# Patient Record
Sex: MALE | Race: WHITE
[De-identification: names, ages, dates, MRNs, and addresses within clinical notes are randomized per-mention and may not be internally consistent; named-entity substitution may affect disease eponyms.]

---

## 2018-02-16 ENCOUNTER — HOSPITAL ENCOUNTER (EMERGENCY)
Dept: HOSPITAL 74 - FER | Age: 68
Discharge: HOME | End: 2018-02-16
Payer: SELF-PAY

## 2018-02-16 VITALS — DIASTOLIC BLOOD PRESSURE: 100 MMHG | SYSTOLIC BLOOD PRESSURE: 153 MMHG

## 2018-02-16 VITALS — HEART RATE: 99 BPM

## 2018-02-16 VITALS — BODY MASS INDEX: 36.3 KG/M2

## 2018-02-16 VITALS — TEMPERATURE: 99.4 F

## 2018-02-16 DIAGNOSIS — I10: ICD-10-CM

## 2018-02-16 DIAGNOSIS — R05: Primary | ICD-10-CM

## 2018-02-16 DIAGNOSIS — E11.9: ICD-10-CM

## 2018-02-16 LAB
ALBUMIN SERPL-MCNC: 3.9 G/DL (ref 3.5–5)
ALP SERPL-CCNC: 80 U/L (ref 32–92)
ALT SERPL-CCNC: 32 U/L (ref 10–40)
ANION GAP SERPL CALC-SCNC: 7 MMOL/L (ref 8–16)
AST SERPL-CCNC: 27 U/L (ref 10–42)
BASOPHILS # BLD: 0.4 % (ref 0–2)
BILIRUB SERPL-MCNC: 1 MG/DL (ref 0.2–1)
BUN SERPL-MCNC: 17 MG/DL (ref 7–18)
CALCIUM SERPL-MCNC: 9.2 MG/DL (ref 8.4–10.2)
CHLORIDE SERPL-SCNC: 102 MMOL/L (ref 98–107)
CO2 SERPL-SCNC: 27 MMOL/L (ref 22–28)
CREAT SERPL-MCNC: 0.8 MG/DL (ref 0.6–1.3)
DEPRECATED RDW RBC AUTO: 11.4 % (ref 11.9–15.9)
EOSINOPHIL # BLD: 1.4 % (ref 0–4.5)
GLUCOSE SERPL-MCNC: 258 MG/DL (ref 74–106)
HCT VFR BLD CALC: 46.3 % (ref 35.4–49)
HGB BLD-MCNC: 16.2 GM/DL (ref 11.7–16.9)
INR BLD: 1.07 (ref 0.82–1.09)
LYMPHOCYTES # BLD: 21.8 % (ref 8–40)
MCH RBC QN AUTO: 32.3 PG (ref 25.7–33.7)
MCHC RBC AUTO-ENTMCNC: 35 G/DL (ref 32–35.9)
MCV RBC: 92.4 FL (ref 80–96)
MONOCYTES # BLD AUTO: 7.8 % (ref 3.8–10.2)
NEUTROPHILS # BLD: 68.6 % (ref 42.8–82.8)
PLATELET # BLD AUTO: 291 K/MM3 (ref 134–434)
PMV BLD: 7.8 FL (ref 7.5–11.1)
POTASSIUM SERPLBLD-SCNC: 3.9 MMOL/L (ref 3.5–5.1)
PROT SERPL-MCNC: 7 G/DL (ref 6.4–8.3)
PT PNL PPP: 12 SEC (ref 10.2–13)
RBC # BLD AUTO: 5.01 M/MM3 (ref 4–5.6)
SODIUM SERPL-SCNC: 136 MMOL/L (ref 136–145)
WBC # BLD AUTO: 8.7 K/MM3 (ref 4–10.8)

## 2018-02-16 PROCEDURE — 3E0337Z INTRODUCTION OF ELECTROLYTIC AND WATER BALANCE SUBSTANCE INTO PERIPHERAL VEIN, PERCUTANEOUS APPROACH: ICD-10-PCS

## 2018-02-16 NOTE — PDOC
Attending Attestation





- Resident


Resident Name: Tirso Jimenez





- ED Attending Attestation


I have performed the following: I have examined & evaluated the patient, The 

case was reviewed & discussed with the resident, I agree w/resident's findings 

& plan, Exceptions are as noted





- HPI


HPI: 





02/16/18 10:58





66 yo M c/ hx of HTN, DM p/w cough x 3 weeks.


The patient travels for several months of the year to [facility].


Recently returned yesterday.


For 3 weeks, has had coughing with in the last 3 days with small amounts of 

hemoptysis.


Denies chest or SOB, but has been endorsing tactile fevers and night sweats.


Pt denies prior hx of TB but does report that TB is quite prevalent in [facility].


Flew in yesterday and came in today for an evaluation.





- Physicial Exam


PE: 





02/16/18 11:00





GENERAL: Awake, alert, and fully oriented, in no acute distress. 


HEAD: No signs of trauma


EYES: PERRLA, EOMI, sclera anicteric, conjunctiva clear


ENT: Auricles normal inspection, hearing grossly normal, nares patent, 

oropharynx clear without exudates.  


NECK: Normal ROM, supple


LUNGS: Breath sounds grossly clear bilaterally. Occasionally ronchorous.


HEART: Regular rate and rhythm, normal S1 and S2, no murmurs, rubs or gallops


ABDOMEN: Soft, nontender, normoactive bowel sounds.  No guarding, no rebound.  

No masses


EXTREMITIES: Normal range of motion, no edema.  No clubbing or cyanosis. No 

cords, erythema, or tenderness


NEUROLOGICAL: Cranial nerves II through XII grossly intact.  Normal speech, 

normal gait


SKIN: Warm, Dry, normal turgor, no rashes or lesions noted.





- Medical Decision Making





02/16/18 11:00


 Vital Signs











Temp Pulse Resp BP Pulse Ox


 


 99.4 F   110 H  18   161/101   96 


 


 02/16/18 10:22  02/16/18 10:22  02/16/18 10:22  02/16/18 10:22  02/16/18 10:22








The patient overall appears nontoxic, but given his history of DM and cough, it'

ll be important to evaluate for PNA, bronchitis, vs. viral syndrome. However, 

given the small amounts of hemotypsis and from [facility], TB should be 

considered. Patient is placed in isolation. Consult ID after results and 

imaging return. In addition, will also perform CTA to rule out PE. Labs, 

cultures, and reassess.


02/16/18 13:23





CAT scan the chest demonstrates no acute lung pathology. Does demonstrate a 

possible partially left adrenal nodule measuring 1.8 cm. This may potentially 

be adrenal adenoma.





The resident Dr. Jimenez had consulted Dr. Longoria. According to infectious disease, 

if the CAT scan of the head demonstrates no findings consistent with 

tuberculosis, patient can be discharged. CAT scan demonstrates no acute findings

, so we'll discharge as bronchitis. We'll initiate with azithromycin and have 

the patient follow-up with his primary care physician.





We'll inform the patient and his son regarding the nodule to have this follow-

up with his doctor.





**Heart Score/ECG Review


  ** #1


ECG reviewed & interpreted by me at: 11:20





02/16/18 11:22


, no std/luciana, T wave flat III,  msec

## 2018-02-16 NOTE — PDOC
History of Present Illness





- General


Chief Complaint: Cold Symptoms


Stated Complaint: COUGH


Time Seen by Provider: 02/16/18 10:25


History Source: Patient


Exam Limitations: No Limitations





- History of Present Illness


Initial Comments: 





02/16/18 11:13


Patient is a 67M immigrant from [facility] (arrived yesterday), HTN, HLD, and DM (

on metformin) here today complaining of scant hemoptysis for the past 3 days, 

with a preceding cough for the past 3 weeks. He endorses associated nightsweats

, and pain with swallowing. Patient denies history of incarceration. Patient 

denies chest pain, history of clots, leg swelling. Patient believes that he was 

vaccinated for TB. Patient denies sick contacts. Denies nausea, vomiting, 

abdominal pain. Denies smoking and illicits. Endorses alcohol use.





Past History





- Past Medical History


Allergies/Adverse Reactions: 


 Allergies











Allergy/AdvReac Type Severity Reaction Status Date / Time


 


No Known Allergies Allergy   Verified 02/16/18 10:31











Home Medications: 


Ambulatory Orders





Azithromycin [Zithromax 250mg Tablets -] 250 mg PO UTDICT #6 tab 02/16/18 


Azithromycin [Zithromax 250mg Tablets -] 250 mg PO UTDICT #6 tab 02/16/18 


Enalapril Maleate [Vasotec] 20 mg PO DAILY 02/16/18 


Metformin HCl 0 mg PO BID 02/16/18 








COPD: No


Diabetes: Yes


HTN: Yes





- Suicide/Smoking/Psychosocial Hx


Smoking History: Never smoked


Hx Alcohol Use: Yes


Drug/Substance Use Hx: No


Substance Use Type: Alcohol





**Review of Systems





- Review of Systems


Comments:: 





02/16/18 11:18


GENERAL/CONSTITUTIONAL: Positive for fever or chills. No weakness.


HEAD, EYES, EARS, NOSE AND THROAT: No change in vision. No ear pain or 

discharge. Positive for sore throat.


CARDIOVASCULAR: No chest pain or shortness of breath


RESPIRATORY: Positive for cough and hemoptysis


GASTROINTESTINAL: No nausea, vomiting, diarrhea. Positive for constipation.


GENITOURINARY: No dysuria, frequency, or change in urination.


MUSCULOSKELETAL: No joint or muscle swelling or pain. No neck or back pain.


SKIN: No rash


NEUROLOGIC: No headache, vertigo, loss of consciousness, or change in strength/

sensation.


ENDOCRINE: No increased thirst. No abnormal weight change


HEMATOLOGIC/LYMPHATIC: No anemia, easy bleeding, or history of blood clots.


ALLERGIC/IMMUNOLOGIC: No hives or skin allergy.








*Physical Exam





- Vital Signs


 Last Vital Signs











Temp Pulse Resp BP Pulse Ox


 


 99.4 F   110 H  18   161/101   96 


 


 02/16/18 10:22  02/16/18 10:22  02/16/18 10:22  02/16/18 10:22  02/16/18 10:22














- Physical Exam


Comments: 





02/16/18 11:19


GENERAL: Awake, alert, and fully oriented, in no acute distress, coughing


HEAD: No signs of trauma, normocephalic, atraumatic


EYES: PERRLA, EOMI, sclera anicteric, conjunctiva clear


ENT: Auricles normal inspection, hearing grossly normal, nares patent, 

oropharynx clear without


exudates. Moist mucosa


NECK: Normal ROM, supple, no lymphadenopathy, JVD, or masses


LUNGS: Coarse breath sounds, speaks full sentences, clear to auscultation 

bilaterally


HEART: Regular rate and rhythm, normal S1 and S2, no murmurs, rubs or gallops, 

peripheral pulses normal and equal bilaterally.


ABDOMEN: Soft, nontender, normoactive bowel sounds.  No guarding, no rebound.  

No masses


EXTREMITIES: Normal inspection, Normal range of motion, no edema.  No clubbing 

or cyanosis.


NEUROLOGICAL: Cranial nerves II through XII grossly intact.  Normal speech, 

normal gait, no focal sensorimotor deficits


SKIN: Warm, Dry, normal turgor, no rashes or lesions noted.








ED Treatment Course





- LABORATORY


CBC & Chemistry Diagram: 


 02/16/18 10:58





 02/16/18 10:58





- RADIOLOGY


Radiology Studies Ordered: 














 Category Date Time Status


 


 CHEST CTA [CT] Stat CT Scan  02/16/18 10:42 Ordered


 


 CHEST X-RAY PORTABLE* [RAD] Stat Radiology  02/16/18 10:42 Ordered














Medical Decision Making





- Medical Decision Making





02/16/18 11:21


67M immigrant from [facility], HTN, DM, HLD here today complaining of hemoptysis. 

Vital signs notable for tachycardia. Differential diagnosis is broad, but 

includes: TB, PE, pneumonia, new onset copd exacerbation. Will evaluate with cbc

, cmp, lactate, pt/inr, cxr, cta pe, trop. Will consult ID. Will treat with 1L 

of fluids.


02/16/18 11:46


EKG shows sinus tachycardia, rate 103. Normal axis. Normal NC, QRS, QTc 

intervals. No st elevations. No significant t wave abnormalities.





CXR read as normal.


02/16/18 12:35


Dr Longoria consulted, appreciate recs. He recommends CT scan of chest, says that 

patient is safe to discharge with outpatient workup if CT negative.


02/16/18 12:36


 Laboratory Tests











  02/16/18 02/16/18 02/16/18





  10:58 10:58 10:58


 


WBC  8.7  


 


Hgb  16.2  


 


Hct  46.3  


 


Plt Count  291  


 


INR    1.07


 


Anion Gap   7 L 


 


Random Glucose   258 H 


 


Troponin I   














  02/16/18





  10:58


 


WBC 


 


Hgb 


 


Hct 


 


Plt Count 


 


INR 


 


Anion Gap 


 


Random Glucose 


 


Troponin I  < 0.03








CBC normal. INR negative. Glucose elevated, rest of CMP reassuring. Trop 

undetectable. Lactate pending. CXR read as normal by radiology. CT chest 

pending.


02/16/18 13:22


CTA chest shows no PE, shows possible adrenal adenoma. Patient advised of result

, told to follow up with primary care physician. Lactate canceled given patient'

s well appearance and lac of gap on CMP.


02/16/18 13:33


HR 99. Patient appears well. Will follow up with PCP. 





*DC/Admit/Observation/Transfer


Diagnosis at time of Disposition: 


 Cough








- Discharge Dispostion


Disposition: HOME


Condition at time of disposition: Good


Admit: No





- Prescriptions


Prescriptions: 


Azithromycin [Zithromax 250mg Tablets -] 250 mg PO UTDICT #6 tab


Azithromycin [Zithromax 250mg Tablets -] 250 mg PO UTDICT #6 tab





- Referrals





- Patient Instructions


Printed Discharge Instructions:  DI for Acute Bronchitis


Additional Instructions: 


You were seen today in the ED for cough. You were given a prescription for 

antibiotics. Please take them as prescribed and complete the course even if you 

feel better. 





You were found to have a possible adrenal adenoma. A copy of your CT scan 

report has been provided to you. Please follow up with your primary care 

physician.





Please return if you have any new worsening or concerning symptoms. Please 

follow up with your PCP in the next week.





- Post Discharge Activity

## 2018-02-19 NOTE — EKG
Test Reason : 

Blood Pressure : ***/*** mmHG

Vent. Rate : 103 BPM     Atrial Rate : 103 BPM

   P-R Int : 180 ms          QRS Dur : 084 ms

    QT Int : 352 ms       P-R-T Axes : 048 -10 055 degrees

   QTc Int : 461 ms

 

SINUS TACHYCARDIA

CANNOT RULE OUT INFERIOR INFARCT , AGE UNDETERMINED

ABNORMAL ECG

NO PREVIOUS ECGS AVAILABLE

Confirmed by JAYJAY JORDAN MD ([id number]) on 2/19/2018 7:56:30 PM

 

Referred By: CRISTIAN PETERSON           Confirmed By:JAYJAY JORDAN MD

## 2018-09-15 ENCOUNTER — HOSPITAL ENCOUNTER (EMERGENCY)
Dept: HOSPITAL 74 - FER | Age: 68
Discharge: HOME | End: 2018-09-15
Payer: COMMERCIAL

## 2018-09-15 VITALS — TEMPERATURE: 98.5 F | HEART RATE: 68 BPM | DIASTOLIC BLOOD PRESSURE: 92 MMHG | SYSTOLIC BLOOD PRESSURE: 148 MMHG

## 2018-09-15 VITALS — BODY MASS INDEX: 36.1 KG/M2

## 2018-09-15 DIAGNOSIS — I10: Primary | ICD-10-CM

## 2018-09-15 DIAGNOSIS — E11.9: ICD-10-CM

## 2018-09-15 NOTE — PDOC
History of Present Illness





- General


History Source: Patient, Family (Daughter.)


Exam Limitations: No Limitations





- History of Present Illness


Initial Comments: 





09/15/18 18:51


The patient is a 67 year old male, with a significant past medical history of 

HTN, HLD, DM, who presents to the emergency department with, 2 days of elevated 

blood pressure. As per patients daughter, his blood pressure has been ranging 

between 170s/90s to 190s/110s with an associated headache and diffuse weakness. 

She reports that today the patient doubled his dosage of Enalapril 20 mg (one 

morning and one evening) as his PCP (unknown doctor at [facility]) recommended. 

The daughter also reports that for many years this patients blood pressure 

normally becomes elevated around this time of year. The patient was recently 

evaluated by his cardiologist and pulmonologist without any pertinent findings 

within the past month. He denies any loss of strength or sensation. He denies 

any recent fevers, chills, or dizziness. He denies any recent nausea, vomit, 

diarrhea or constipation. He denies any recent chest pain or shortness of 

breath. He denies any recent dysuria, frequency, urgency or hematuria. The 

history was taken from the patients daughter at bedside due to language 

barriers. 





Allergies: NKA


Past surgical history: None reported.


Social History: Social alcohol usage. Nonsmoker. Denies recreational drug use. 








<Germán Cross - Last Filed: 09/15/18 18:50>





<Bethany Plummer - Last Filed: 09/16/18 11:46>





- General


Chief Complaint: Blood Pressure Problem


Stated Complaint: HTN


Time Seen by Provider: 09/15/18 18:34





Past History





<Germán Cross - Last Filed: 09/15/18 18:50>





- Past Medical History


COPD: No


Diabetes: Yes


HTN: Yes





- Suicide/Smoking/Psychosocial Hx


Smoking History: Never smoked


Have you smoked in the past 12 months: No


Information on smoking cessation initiated: No


Hx Alcohol Use: No


Drug/Substance Use Hx: No


Substance Use Type: Alcohol





<Bethany Plummer - Last Filed: 09/16/18 11:46>





- Past Medical History


Allergies/Adverse Reactions: 


 Allergies











Allergy/AdvReac Type Severity Reaction Status Date / Time


 


No Known Allergies Allergy   Verified 09/15/18 18:25











Home Medications: 


Ambulatory Orders





Enalapril Maleate [Vasotec] 20 mg PO DAILY 02/16/18 


metFORMIN HCL [Metformin HCl] 2,000 mg PO BID 02/16/18 


Atorvastatin Ca [Lipitor] 40 mg PO HS 09/15/18 











**Review of Systems





- Review of Systems


Able to Perform ROS?: Yes


Comments:: 





09/15/18 18:51


+GENERAL/CONSTITUTIONAL: Diffuse weakness. No fever or chills. 


HEAD, EYES, EARS, NOSE AND THROAT: No change in vision. No ear pain or 

discharge. No sore throat.


CARDIOVASCULAR: No chest pain or shortness of breath.


RESPIRATORY: No cough, wheezing, or hemoptysis.


GASTROINTESTINAL: No nausea, vomiting, diarrhea or constipation.


GENITOURINARY: No dysuria, frequency, or change in urination.


MUSCULOSKELETAL: No joint or muscle swelling or pain. No neck or back pain.


SKIN: No rash


+NEUROLOGIC: Headache. No vertigo, loss of consciousness, or change in strength/

sensation.


ENDOCRINE: No increased thirst. No abnormal weight change.


HEMATOLOGIC/LYMPHATIC: No anemia, easy bleeding, or history of blood clots.


ALLERGIC/IMMUNOLOGIC: No hives or skin allergy.





All Other Systems: Reviewed and Negative





<Germán Cross - Last Filed: 09/15/18 18:50>





*Physical Exam





- Vital Signs


 Last Vital Signs











Temp Pulse Resp BP Pulse Ox


 


 98.5 F   68   20   148/92   99 


 


 09/15/18 18:25  09/15/18 18:25  09/15/18 18:25  09/15/18 18:25  09/15/18 18:25














- Physical Exam


Comments: 





09/15/18 18:51


GENERAL: Awake, alert, and fully oriented, in no acute distress


HEAD: No signs of trauma


ENT: Hearing grossly normal, nares patent, oropharynx clear without exudates. 

Moist mucosa


NECK: Normal ROM, supple, no lymphadenopathy, JVD, or masses


LUNGS: Breath sounds equal, clear to auscultation bilaterally.  No wheezes, and 

no crackles


HEART: Regular rate and rhythm, normal S1 and S2, no murmurs, rubs or gallops


ABDOMEN: Soft, nontender, normoactive bowel sounds.  No guarding, no rebound.  

No masses


EXTREMITIES: Normal range of motion, no edema.  No clubbing or cyanosis. No 

cords, erythema, or tenderness


NEUROLOGICAL: Cranial nerves II through XII grossly intact.  Normal speech, 

normal gait


SKIN: Warm, Dry, normal turgor, no rashes or lesions noted.





<Germán Cross - Last Filed: 09/15/18 18:50>





- Vital Signs


 Last Vital Signs











Temp Pulse Resp BP Pulse Ox


 


 98.5 F   68   20   148/92   99 


 


 09/15/18 18:25  09/15/18 18:25  09/15/18 18:25  09/15/18 18:25  09/15/18 18:25














<Bethany Plummer - Last Filed: 09/16/18 11:46>





**Heart Score/ECG Review





- ECG Impressions


Comment:: 





EKG read 19:03- sinus rhythm with 1st deg AV block, rate 68 bpm, incomplete RBBB


Similar to prior EKG from February 2018








<Bethany Plummer - Last Filed: 09/16/18 11:46>





Medical Decision Making





- Medical Decision Making





Pt with HTN, no significant neuro or cardiac symptoms. BP is at his baseline at 

present after taking extra dose of enalapril. As this change in BP seems to 

happen at certain times of the year, would not add additional medications at 

this time. Will refer back to his PMD, as he will need to be monitored closely 

if any changes needed. 








<Bethany Plummer - Last Filed: 09/16/18 11:46>





*DC/Admit/Observation/Transfer





- Attestations


Scribe Attestion: 





09/15/18 18:51





Documentation prepared by Germán Cross, acting as medical scribe for 

Bethany Plummer MD.





<Germán Cross - Last Filed: 09/15/18 18:50>





- Discharge Dispostion


Decision to Admit order: No





<Bethany Plummer - Last Filed: 09/16/18 11:46>


Diagnosis at time of Disposition: 


Hypertension


Qualifiers:


 Hypertension type: essential hypertension Qualified Code(s): I10 - Essential (

primary) hypertension








- Discharge Dispostion


Disposition: HOME


Condition at time of disposition: Stable





- Patient Instructions


Printed Discharge Instructions:  DI for High Blood Pressure


Additional Instructions: 


CONTINUE TO TAKE MEDICATION AS PRESCRIBED. IF BLOOD PRESSURE IS ELEVATED 

TOMORROW, CAN TAKE AN EXTRA DOSE OF MEDICATION AS YOU DID TODAY. FOLLOW UP WITH 

YOUR DOCTOR ON MONDAY, AS THE MEDICATION MAY NEED TO BE ADJUSTED IF YOUR BLOOD 

PRESSURE REMAINS HIGH.

## 2018-09-16 NOTE — EKG
Test Reason : 

Blood Pressure : ***/*** mmHG

Vent. Rate : 067 BPM     Atrial Rate : 067 BPM

   P-R Int : 218 ms          QRS Dur : 092 ms

    QT Int : 398 ms       P-R-T Axes : 043 -04 046 degrees

   QTc Int : 420 ms

 

SINUS RHYTHM WITH 1ST DEGREE A-V BLOCK

INCOMPLETE RIGHT BUNDLE BRANCH BLOCK

POSSIBLE INFERIOR INFARCT (CITED ON OR BEFORE 16-FEB-2018)

ABNORMAL ECG

WHEN COMPARED WITH ECG OF 16-FEB-2018 11:20,

TX INTERVAL HAS INCREASED

VENT. RATE HAS DECREASED BY  36 BPM

Confirmed by ZULY MACEDO MD ([id number]) on 9/16/2018 10:21:54 PM

 

Referred By: DR GAO           Confirmed By:ZULY MACEDO MD

## 2020-02-03 ENCOUNTER — HOSPITAL ENCOUNTER (EMERGENCY)
Dept: HOSPITAL 74 - FER | Age: 70
Discharge: HOME | End: 2020-02-03
Payer: COMMERCIAL

## 2020-02-03 VITALS — DIASTOLIC BLOOD PRESSURE: 97 MMHG | HEART RATE: 76 BPM | SYSTOLIC BLOOD PRESSURE: 139 MMHG

## 2020-02-03 VITALS — BODY MASS INDEX: 34.5 KG/M2

## 2020-02-03 VITALS — TEMPERATURE: 98.5 F

## 2020-02-03 DIAGNOSIS — R42: Primary | ICD-10-CM

## 2020-02-03 DIAGNOSIS — I10: ICD-10-CM

## 2020-02-03 DIAGNOSIS — E11.9: ICD-10-CM

## 2020-02-03 DIAGNOSIS — E78.5: ICD-10-CM

## 2020-02-03 LAB
ALBUMIN SERPL-MCNC: 4.1 G/DL (ref 3.4–5)
ALP SERPL-CCNC: 60 U/L (ref 45–117)
ALT SERPL-CCNC: 35 U/L (ref 13–61)
ANION GAP SERPL CALC-SCNC: 7 MMOL/L (ref 8–16)
AST SERPL-CCNC: 23 U/L (ref 15–37)
BASOPHILS # BLD: 0.4 % (ref 0–2)
BILIRUB SERPL-MCNC: 0.8 MG/DL (ref 0.2–1)
BUN SERPL-MCNC: 15 MG/DL (ref 7–18)
CALCIUM SERPL-MCNC: 9.1 MG/DL (ref 8.5–10)
CHLORIDE SERPL-SCNC: 102 MMOL/L (ref 98–107)
CO2 SERPL-SCNC: 28 MMOL/L (ref 21–32)
CREAT SERPL-MCNC: 1 MG/DL (ref 0.55–1.3)
DEPRECATED RDW RBC AUTO: 11.9 % (ref 11.9–15.9)
EOSINOPHIL # BLD: 1.7 % (ref 0–4.5)
GLUCOSE SERPL-MCNC: 155 MG/DL (ref 74–106)
HCT VFR BLD CALC: 42.8 % (ref 35.4–49)
HGB BLD-MCNC: 14.2 GM/DL (ref 11.7–16.9)
LYMPHOCYTES # BLD: 26.8 % (ref 8–40)
MCH RBC QN AUTO: 30.6 PG (ref 25.7–33.7)
MCHC RBC AUTO-ENTMCNC: 33.2 G/DL (ref 32–35.9)
MCV RBC: 92.3 FL (ref 80–96)
MONOCYTES # BLD AUTO: 7 % (ref 3.8–10.2)
NEUTROPHILS # BLD: 64.1 % (ref 42.8–82.8)
PLATELET # BLD AUTO: 252 K/MM3 (ref 134–434)
PMV BLD: 8 FL (ref 7.5–11.1)
POTASSIUM SERPLBLD-SCNC: 3.8 MMOL/L (ref 3.5–5.1)
PROT SERPL-MCNC: 7 G/DL (ref 6.4–8.2)
RBC # BLD AUTO: 4.64 M/MM3 (ref 4–5.6)
SODIUM SERPL-SCNC: 137 MMOL/L (ref 136–145)
WBC # BLD AUTO: 6.8 K/MM3 (ref 4–10.8)

## 2020-02-03 NOTE — PDOC
History of Present Illness





<Joel Harrington - Last Filed: 02/03/20 12:39>





- General


History Source: Patient, Family


Exam Limitations: Language Barrier





- History of Present Illness


Initial Comments: 





02/03/20 09:50


Nick Negrete is a 69 year old Japanese male with PMH HTN, HLD, DM presenting 

with one month of dizziness and unsteady gait.





Patient presents with daughter who lives with him and translates. Patient and 

daughter report patient has been having one month of what patient describes as 

dizziness, now with worsening unsteady gait for the last 10 days. Does not say 

that his legs feel weak or that the room is spinning, but rather that he feels 

off-balance like he is going to fall. Almost fell 3 days ago, denies any head 

injury or LOC. Daughter brought here today for worsening ability to ambulate.





Denies prior history of dizziness, denies room-spinning vertigo, denies 

tinnitus. Denies any chest pain, palpitations, SOB, abdominal pain. Does 

endorse a HA, occurs sporadically in the back of his head, no inciting triggers

, stays for 40 minutes and disappears on its own, has trialled Tylenol and 

Advil without effect, denies vision changes or related N/V.





No known allergies.


Takes anti-hypertensive medications daily, says BP has been elevated.


PMD Dr. Lynch (?), has not seen in >6 months.


PSH R ankle procedure for injury a long time ago, R eye surgery








<Campbell Lux - Last Filed: 02/03/20 15:48>





- General


Chief Complaint: Lightheaded


Stated Complaint: DIZZINESS


Time Seen by Provider: 02/03/20 09:49





Past History





<Joel Harrington - Last Filed: 02/03/20 12:39>





- Past Medical History


COPD: No


Diabetes: Yes


HTN: Yes





- Psycho Social/Smoking Cessation Hx


Smoking History: Never smoked


Have you smoked in the past 12 months: No


Hx Alcohol Use: No


Drug/Substance Use Hx: No


Substance Use Type: Alcohol





<Campbell Lux - Last Filed: 02/03/20 15:48>





- Past Medical History


Allergies/Adverse Reactions: 


 Allergies











Allergy/AdvReac Type Severity Reaction Status Date / Time


 


No Known Allergies Allergy   Verified 02/03/20 09:49











Home Medications: 


Ambulatory Orders





Aspirin [Aspirin EC] 81 mg PO DAILY 02/03/20 


Atorvastatin Ca [Lipitor] 10 mg PO HS 02/03/20 


Enalapril Maleate [Vasotec] 20 mg PO DAILY 02/03/20 


Hydrochlorothiazide [Hctz -] 25 mg PO DAILY 02/03/20 


Meclizine HCl [Antivert -] 25 mg PO TID #20 tablet 02/03/20 


Metformin HCl [Glucophage] 1,000 mg PO BID 02/03/20 











**Review of Systems





- Review of Systems


Able to Perform ROS?: Yes


Constitutional: No: Chills, Fever


HEENTM: No: Blurred Vision, Double Vision, Hearing Loss


Respiratory: No: Cough, Shortness of Breath, Productive cough


Cardiac (ROS): No: Chest Pain, Edema, Irregular Heart Rate, Lightheadedness, 

Palpitations, Syncope, Chest Tightness


ABD/GI: No: Constipated, Diarrhea, Nausea, Poor Appetite, Poor Fluid Intake, 

Vomiting


: No: Symptoms Reported


Musculoskeletal: No: Back Pain, Muscle Pain, Muscle Weakness, Neck Pain


Integumentary: No: Symptoms Reported


Neurological: Yes: Headache, Unsteady Gait, Dizziness.  No: Numbness, 

Paresthesia, Weakness


Endocrine: No: Symptoms Reported


Hematologic/Lymphatic: No: Symptoms Reported


All Other Systems: Reviewed and Negative





<Campbell Lux - Last Filed: 02/03/20 15:48>





*Physical Exam





- Vital Signs


 Last Vital Signs











Temp Pulse Resp BP Pulse Ox


 


 98.5 F   73   16   156/103 H  96 


 


 02/03/20 09:47  02/03/20 11:58  02/03/20 10:05  02/03/20 11:58  02/03/20 10:05














<Joel Harrington - Last Filed: 02/03/20 12:39>





- Physical Exam


General Appearance: Yes: Nourished, Appropriately Dressed, Other (well-

appearing male resting comfortably in bed in no acute distress).  No: Apparent 

Distress


HEENT: positive: EOMI, NARA, Normal ENT Inspection, Normal Voice, Symmetrical, 

Pharynx Normal, Hearing Grossly Normal.  negative: Scleral Icterus (R), Scleral 

Icterus (L), Pharyngeal Erythema, Tonsillar Exudate, Hearing Decreased


Neck: positive: Trachea midline, Normal Thyroid, Supple.  negative: Tender, 

Decreased range of motion, Lymphadenopathy (R), Lymphadenopathy (L)


Respiratory/Chest: positive: Lungs Clear, Normal Breath Sounds.  negative: 

Chest Tender, Respiratory Distress, Accessory Muscle Use, Crackles, Rales, 

Rhonchi, Stridor, Wheezing


Cardiovascular: positive: Regular Rhythm, Regular Rate.  negative: Murmur


Gastrointestinal/Abdominal: positive: Normal Bowel Sounds, Soft, Protuberent.  

negative: Tender, Organomegaly, Pulsatile Mass, Guarding, Rebound


Musculoskeletal: positive: Normal Inspection.  negative: CVA Tenderness, 

Decreased Range of Motion


Extremity: positive: Normal Capillary Refill, Normal Inspection, Normal Range 

of Motion, Pelvis Stable.  negative: Tender


Integumentary: positive: Normal Color, Dry, Warm.  negative: Jaundice, Mottled, 

Cold, Clammy, Diaphoresis, Swelling


Neurologic: positive: CNs II-XII NML intact, Fully Oriented, Alert, Normal Mood/

Affect, Normal Response, Motor Strength 5/5, Finger to Nose (normal), Other (

Normal gait, but has truncal ataxia after standing/walking for >30 seconds. CN 

exam normal. 5/5 motor strength all groups. No sensory deficits to LT. No 

nystagmus. HINTS: no saccades, no nystagmus, no skew.).  negative: Numbness, 

Sensory Deficit





<Campbell Lux - Last Filed: 02/03/20 15:48>





ED Treatment Course





- LABORATORY


CBC & Chemistry Diagram: 


 02/03/20 10:20





 02/03/20 10:20





- ADDITIONAL ORDERS


Additional order review: 


 Laboratory  Results











  02/03/20 02/03/20 02/03/20





  10:20 10:20 10:20


 


Sodium    137


 


Potassium    3.8


 


Chloride    102


 


Carbon Dioxide    28


 


Anion Gap    7 L


 


BUN    15.0


 


Creatinine    1.0


 


Est GFR (CKD-EPI)AfAm    89.23


 


Est GFR (CKD-EPI)NonAf    76.99


 


Random Glucose    155 H


 


Calcium    9.1


 


Total Bilirubin    0.8


 


AST    23


 


ALT    35


 


Alkaline Phosphatase    60


 


Creatine Kinase  43  


 


Troponin I   < 0.03 


 


Total Protein    7.0


 


Albumin    4.1








 











  02/03/20





  10:20


 


RBC  4.64


 


MCV  92.3


 


MCHC  33.2


 


RDW  11.9


 


MPV  8.0


 


Neutrophils %  64.1


 


Lymphocytes %  26.8  D


 


Monocytes %  7.0


 


Eosinophils %  1.7


 


Basophils %  0.4














<D'Ambrosia,Joel J - Last Filed: 02/03/20 12:39>





- LABORATORY


CBC & Chemistry Diagram: 


 02/03/20 10:20





 02/03/20 10:20





- RADIOLOGY


Radiograph Interpretation: 





02/03/20 12:18


CT Head





There is mild-to-moderate volume loss and ventricular dilatation. Probable 

minimal periventricular chronic microvascular ischemic disease changes are 

present. No mass lesion, gross acute infarct, intracranial hemorrhage or 

abnormal intracranial enhancement are identified. There is no shift of the 

midline structures. The craniocervical junction appears unremarkable. The 

calvarium is intact. Faint calcification of the cavernous carotid arteries are 

present. Mastoid air cells are well aerated. There is a partially included 

retention cyst versus polyp in the left maxillary antrum measuring 1.5 cm. 

Otherwise, the visualized paranasal sinuses are well aerated. 





IMPRESSION: 


 


Mild-to-moderate volume loss and probable minimal periventricular chronic 

microvascular ischemic disease changes.  No acute intracranial pathology or 

abnormal enhancement are identified.  Partially included approximately 1.5 cm 

retention cyst versus polyp in the left maxillary antrum. 








<Campbell Lux - Last Filed: 02/03/20 15:48>





Medical Decision Making





- Medical Decision Making





02/03/20 10:33


Patient presents with over a week of unsteady gait associated with dizziness 

and intermittent posterior HA. Patient has no significant CN deficits, no motor 

deficits, no sensory deficits, no PMH CVA or MI. Notable truncal ataxia and 

unsteady gait. Concerned for posterior CVA vs. mass lesion as cause of symptoms 

constellation, has some risk factors including HTN, HLD, DM.





- CMP/CBC for eval renal function and infection


- ECG for eval arrhythmia as cause of dizziness


- CP for eval cardiac function


- CT Head with contrast to eval for mass





02/03/20 11:24


ECG shows 1st degree AV block with incomplete RBBB, Q waves in III, HR 76, QRS 

94, QTc 425, no other concerning TWI or ischemic changes.





Labs notable for:


- CMP WNL


- CBC WNL


- CP WNL





02/03/20 12:19


CT head shows no evidence of any enhancing or mass lesion. No evidence of 

cardiac disease as cause at this time.


If posterior CVA, would likely appear on CT given prolonged course of symptoms.


Re-evaluation shows patient feels dizzy with position changes but goes away 

with time, consistent with BPPV.





Will trial meclizine and d/c home with ENT and neuro f/u.








<Campbell Lux - Last Filed: 02/03/20 15:48>





Discharge





- Discharge Information


Problems reviewed: Yes





- Admission


No





<Joel Harrington - Last Filed: 02/03/20 12:39>





- Discharge Information


Problems reviewed: Yes





<Campbell Lux - Last Filed: 02/03/20 15:48>





- Discharge Information


Clinical Impression/Diagnosis: 


 Vertigo





Condition: Stable


Disposition: HOME





- Additional Discharge Information


Prescriptions: 


Meclizine HCl [Antivert -] 25 mg PO TID #20 tablet





- Follow up/Referral


Referrals: 


Kary Jo MD [Primary Care Provider] - 


Kwabena Hirsch MD [Staff Physician] - 


Rosalino Mckeon DO [Staff Physician] - 





- Patient Discharge Instructions


Patient Printed Discharge Instructions:  DI for Vertigo


Additional Instructions: 


Scan of the head showed no disease in the brain.  Symptoms may be due to 

inflammation of the balance control center of the inner ear.  Trial of 

medications as directed.  See ENT specialist and neurologist for follow-up as 

directed.  Return to ER if symptoms worse.





- Post Discharge Activity

## 2020-02-03 NOTE — PDOC
Attending Attestation





- Resident


Resident Name: JulianneevelynCampbell





- ED Attending Attestation


I have performed the following: I have examined & evaluated the patient, The 

case was reviewed & discussed with the resident, I agree w/resident's findings 

& plan, Exceptions are as noted





- HPI


HPI: 





02/03/20 13:08


According to his daughter, the patient has been unsteady on his feet for the 

past 2 weeks.  This appears to be transient.  It occurs momentarily, and then 

resolves, allowing him to resume his normal activity.  He also feels unsteady 

when he changes positions from a lying to a sitting posture.





No other visual or focal neurologic symptoms.  No focal weakness.  No fever/

chills URI symptoms sore throat cough.  No earache, tinnitus, or noticeable 

decreased hearing.





- Physicial Exam


PE: 





02/03/20 13:11


Physical exam: Mildly elevated blood pressure, otherwise vital signs normal.  

Afebrile.


PERRLA 3 mm, fundi benign with sharp disc margins and good central venous 

pulsations, no hemorrhages or exudates.  EOMs full without diplopia.  No 

nystagmus.  Visual fields intact to confrontation.


Ears nose and throat clear


Neck supple without bruit mass or nodes


Lungs clear


CV regular without murmur rub or gallop


Abdomen benign


Neurological C2 to 12 intact.  Strength full and symmetric.  No focal 

sensorimotor deficits.  Gait is momentarily unsteady when arising, but then 

stabilizes.





- Medical Decision Making





02/03/20 13:12


Assessment: Although the patient denies the room "spinning around" his symptoms 

suggest vestibular/labyrinthine irritation or inflammation.  Rule out brain 

lesion





Plan: CT of the brain with contrast is negative.  Trial of meclizine seem to 

improve his symptoms.  To continue meclizine 3 times daily, follow-up with ENT, 

neurologist as referred.  Better blood pressure control with regular cardiology 

appointments as scheduled.  Return to ER if symptoms worsen.  Fully ambulatory 

and in no significant distress at discharge with daughter to follow-up as 

directed

## 2020-02-04 NOTE — EKG
Test Reason : 

Blood Pressure : ***/*** mmHG

Vent. Rate : 076 BPM     Atrial Rate : 076 BPM

   P-R Int : 214 ms          QRS Dur : 094 ms

    QT Int : 378 ms       P-R-T Axes : 039 -11 045 degrees

   QTc Int : 425 ms

 

SINUS RHYTHM WITH 1ST DEGREE A-V BLOCK

INCOMPLETE RIGHT BUNDLE BRANCH BLOCK

INFERIOR INFARCT (CITED ON OR BEFORE 16-FEB-2018)

ABNORMAL ECG

WHEN COMPARED WITH ECG OF 15-SEP-2018 19:01,

NO SIGNIFICANT CHANGE WAS FOUND

Confirmed by Shaka Marques MD ([id number]) on 2/4/2020 9:27:41 AM

 

Referred By: TANNER HOOKER           Confirmed By:Shaka Marques MD

## 2020-07-25 ENCOUNTER — HOSPITAL ENCOUNTER (INPATIENT)
Dept: HOSPITAL 74 - FER | Age: 70
LOS: 2 days | Discharge: HOME | DRG: 501 | End: 2020-07-27
Attending: NURSE PRACTITIONER | Admitting: GENERAL ACUTE CARE HOSPITAL
Payer: COMMERCIAL

## 2020-07-25 VITALS — BODY MASS INDEX: 35 KG/M2

## 2020-07-25 DIAGNOSIS — N47.6: Primary | ICD-10-CM

## 2020-07-25 DIAGNOSIS — Z79.84: ICD-10-CM

## 2020-07-25 DIAGNOSIS — E11.65: ICD-10-CM

## 2020-07-25 DIAGNOSIS — I10: ICD-10-CM

## 2020-07-25 DIAGNOSIS — E78.5: ICD-10-CM

## 2020-07-25 DIAGNOSIS — Z11.59: ICD-10-CM

## 2020-07-25 DIAGNOSIS — E66.9: ICD-10-CM

## 2020-07-25 LAB
ALBUMIN SERPL-MCNC: 4.1 G/DL (ref 3.4–5)
ALP SERPL-CCNC: 73 U/L (ref 45–117)
ALT SERPL-CCNC: 34 U/L (ref 13–61)
ANION GAP SERPL CALC-SCNC: 14 MMOL/L (ref 8–16)
AST SERPL-CCNC: 25 U/L (ref 15–37)
BASOPHILS # BLD: 0.4 % (ref 0–2)
BILIRUB SERPL-MCNC: 0.8 MG/DL (ref 0.2–1)
BUN SERPL-MCNC: 27 MG/DL (ref 7–18)
CALCIUM SERPL-MCNC: 9.7 MG/DL (ref 8.5–10)
CHLORIDE SERPL-SCNC: 98 MMOL/L (ref 98–107)
CO2 SERPL-SCNC: 23 MMOL/L (ref 21–32)
CREAT SERPL-MCNC: 1 MG/DL (ref 0.55–1.3)
DEPRECATED RDW RBC AUTO: 11.9 % (ref 11.9–15.9)
EOSINOPHIL # BLD: 1.9 % (ref 0–4.5)
EPITH CASTS URNS QL MICRO: (no result) /HPF
GLUCOSE SERPL-MCNC: 375 MG/DL (ref 74–106)
HCT VFR BLD CALC: 42 % (ref 35.4–49)
HGB BLD-MCNC: 14.6 GM/DL (ref 11.7–16.9)
LYMPHOCYTES # BLD: 27.5 % (ref 8–40)
MCH RBC QN AUTO: 31.2 PG (ref 25.7–33.7)
MCHC RBC AUTO-ENTMCNC: 34.8 G/DL (ref 32–35.9)
MCV RBC: 89.7 FL (ref 80–96)
MONOCYTES # BLD AUTO: 5 % (ref 3.8–10.2)
NEUTROPHILS # BLD: 65.2 % (ref 42.8–82.8)
PLATELET # BLD AUTO: 253 K/MM3 (ref 134–434)
PMV BLD: 7.6 FL (ref 7.5–11.1)
POTASSIUM SERPLBLD-SCNC: 3.9 MMOL/L (ref 3.5–5.1)
PROT SERPL-MCNC: 6.8 G/DL (ref 6.4–8.2)
RBC # BLD AUTO: 4.68 M/MM3 (ref 4–5.6)
SODIUM SERPL-SCNC: 135 MMOL/L (ref 136–145)
WBC # BLD AUTO: 7.6 K/MM3 (ref 4–10.8)

## 2020-07-25 PROCEDURE — U0003 INFECTIOUS AGENT DETECTION BY NUCLEIC ACID (DNA OR RNA); SEVERE ACUTE RESPIRATORY SYNDROME CORONAVIRUS 2 (SARS-COV-2) (CORONAVIRUS DISEASE [COVID-19]), AMPLIFIED PROBE TECHNIQUE, MAKING USE OF HIGH THROUGHPUT TECHNOLOGIES AS DESCRIBED BY CMS-2020-01-R: HCPCS

## 2020-07-25 RX ADMIN — ATORVASTATIN CALCIUM SCH MG: 10 TABLET, FILM COATED ORAL at 21:46

## 2020-07-25 RX ADMIN — ENOXAPARIN SODIUM SCH MG: 40 INJECTION SUBCUTANEOUS at 14:40

## 2020-07-25 RX ADMIN — SODIUM CHLORIDE SCH MLS/HR: 9 INJECTION, SOLUTION INTRAVENOUS at 14:35

## 2020-07-25 RX ADMIN — INSULIN ASPART SCH UNIT: 100 INJECTION, SOLUTION INTRAVENOUS; SUBCUTANEOUS at 17:24

## 2020-07-25 RX ADMIN — CLINDAMYCIN PHOSPHATE SCH MLS/HR: 600 INJECTION, SOLUTION INTRAVENOUS at 18:17

## 2020-07-25 RX ADMIN — INSULIN ASPART SCH UNIT: 100 INJECTION, SOLUTION INTRAVENOUS; SUBCUTANEOUS at 21:47

## 2020-07-25 NOTE — HP
CHIEF COMPLAINT: Penile pain / swelling 





PCP:Kary Jo








HISTORY OF PRESENT ILLNESS:


This is a 69 year old [place] male with PMHX of  HTN, HLD, and DM who was sent 

from urgent care for the evaluation of penile swelling and pain. Pt states 

swelling/pain  started about 4 days ago.  Pt denies any penile trauma, scrotal/ 

testicular pain,no discharge, fever, chills, cp, sob, palpitations, abdominal 

pain, N/V/D, hematuria, dysuria, frequency or oliguria.





ER course was notable for:


(1) Afebrile, wbc 7.6, 


(2)Na 135, Bun 27, Cre 1, Blood glucose 375


(3) US :  No sonographic evidence of  soft tissue abnormality , cellulitis





Recent Travel:No 





PAST MEDICAL HISTORY:


As mentioned above 


PAST SURGICAL HISTORY:


Rt ankle procedure for injury a long time ago,, >50 yrs ago 


 R eye surgery 6 months ago for cataract 





Social History:


Smoking:Never 


Alcohol: Social, 1-2 drinks per week


Drugs: None





Allergies





No Known Allergies Allergy (Verified 07/25/20 11:26)


   








HOME MEDICATIONS:


                                Home Medications











 Medication  Instructions  Recorded


 


Aspirin [Aspirin EC] 81 mg PO DAILY 02/03/20


 


Atorvastatin Ca [Lipitor] 10 mg PO HS 02/03/20


 


Enalapril Maleate [Vasotec] 20 mg PO DAILY 02/03/20


 


Hydrochlorothiazide [Hctz -] 25 mg PO DAILY 02/03/20


 


Metformin HCl [Glucophage] 1,000 mg PO BID 02/03/20








REVIEW OF SYSTEMS


CONSTITUTIONAL: 


Absent:  fever, chills, diaphoresis, generalized weakness, malaise, loss of 

appetite, weight change


HEENT: 


Absent:  rhinorrhea, nasal congestion, throat pain, throat swelling, difficulty 

swallowing, mouth swelling, ear pain, eye pain, visual changes


CARDIOVASCULAR: 


Absent: chest pain, syncope, palpitations, irregular heart rate, 

lightheadedness, peripheral edema


RESPIRATORY: 


Absent: cough, shortness of breath, dyspnea with exertion, orthopnea, wheezing, 

stridor, hemoptysis


GASTROINTESTINAL:


Absent: abdominal pain, abdominal distension, nausea, vomiting, diarrhea, 

constipation, melena, hematochezia


GENITOURINARY:  penile pain 


Absent: dysuria, frequency, urgency, hesitancy, hematuria, flank pain, genital 

pain


MUSCULOSKELETAL: 


Absent: myalgia, arthralgia, joint swelling, back pain, neck pain


SKIN: 


Absent: rash, itching, pallor


HEMATOLOGIC/IMMUNOLOGIC: 


Absent: easy bleeding, easy bruising, lymphadenopathy, frequent infections


ENDOCRINE:


Absent: unexplained weight gain, unexplained weight loss, heat intolerance, cold

 intolerance


NEUROLOGIC: 


Absent: headache, focal weakness or paresthesias, dizziness, unsteady gait, 

seizure, mental status changes, bladder or bowel incontinence


PSYCHIATRIC: 


Absent: anxiety, depression, suicidal or homicidal ideation, hallucinations.








PHYSICAL EXAMINATION


                               Vital Signs - 24 hr











  07/25/20





  11:00


 


Temperature 98.6 F


 


Pulse Rate 100 H


 


Respiratory 16





Rate 


 


Blood Pressure 129/90


 


O2 Sat by Pulse 100





Oximetry (%) 











GENERAL: Awake, alert, and fully oriented, in no acute distress.


HEAD: Normal with no signs of trauma.


EYES: Pupils equal, round and reactive to light, extraocular movements intact, 

sclera anicteric, conjunctiva clear. No lid lag.


EARS, NOSE, THROAT: Ears normal, nares patent, oropharynx clear without 

exudates. Moist mucous membranes.


NECK: Normal range of motion, supple without lymphadenopathy, JVD, or masses.


LUNGS: Breath sounds equal, clear to auscultation bilaterally. No wheezes, and 

no crackles. No accessory muscle use.


HEART: Regular rate and rhythm, normal S1 and S2 without murmur, rub or gallop.


ABDOMEN: Soft, nontender, not distended, normoactive bowel sounds, no guarding, 

no rebound, no masses.  No hepatomegaly or  splenomegaly. 


MUSCULOSKELETAL: Normal range of motion at all joints. No bony deformities or 

tenderness. No CVA tenderness.


UPPER EXTREMITIES: 2+ pulses, warm, well-perfused. No cyanosis. No clubbing. No 

peripheral edema.


LOWER EXTREMITIES: 2+ pulses, warm, well-perfused. No calf tenderness. No 

peripheral edema. 


NEUROLOGICAL:  Cranial nerves II-XII intact. Normal speech. Normal gait.


PSYCHIATRIC: Cooperative. Good eye contact. Appropriate mood and affect.


SKIN: Warm, dry, normal turgor, no rashes or lesions noted, normal capillary 

refill.


 , penile redness/ pain at the tiop of penis,no discharge, 


                         Laboratory Results - last 24 hr











  07/25/20 07/25/20 07/25/20





  11:40 11:45 11:45


 


WBC   7.6 


 


RBC   4.68 


 


Hgb   14.6 


 


Hct   42.0 


 


MCV   89.7 


 


MCH   31.2 


 


MCHC   34.8 


 


RDW   11.9 


 


Plt Count   253 


 


MPV   7.6 


 


Absolute Neuts (auto)   5.0 


 


Neutrophils %   65.2 


 


Lymphocytes %   27.5 


 


Monocytes %   5.0 


 


Eosinophils %   1.9 


 


Basophils %   0.4 


 


Sodium    135 L


 


Potassium    3.9


 


Chloride    98


 


Carbon Dioxide    23


 


Anion Gap    14


 


BUN    27.0 H


 


Creatinine    1.0


 


Est GFR (CKD-EPI)AfAm    89.23


 


Est GFR (CKD-EPI)NonAf    76.99


 


Random Glucose    375 H


 


Calcium    9.7


 


Total Bilirubin    0.8


 


AST    25


 


ALT    34


 


Alkaline Phosphatase    73


 


Total Protein    6.8


 


Albumin    4.1


 


Urine Color  Yellow  


 


Urine Appearance  Clear  


 


Urine pH  5.0  


 


Urine Protein  Negative  


 


Urine Glucose (UA)  3+  


 


Urine Ketones  Negative  


 


Urine Blood  Trace-intact  


 


Urine Nitrite  Negative  


 


Urine Bilirubin  Negative  


 


Urine Urobilinogen  0.2  


 


Ur Leukocyte Esterase  Negative  


 


Urine RBC  2-5  


 


Urine WBC  0-2  


 


Ur Transition Epith Cell  Few  


 


Urine Bacteria  Few  























ASSESSMENT/PLAN:


69 year old [place] male with PMH HTN, HLD, DM presents for the evaluation of 

penile swelling and pain.Admitted with  Balanitis, Penile cellulitis.





 *Balanitis, Penile cellulitis


- afebrile with no leukocytosis 


- UA  trace blood, no pyuria 


- will f/u on blood and Urine culture 


- s/p Clindamycin in ER, will cont 


- pain control


- ER,case discussed with urology Dr. Bach, rec IV abx. If improvement can 

dc tomorrow on oral abx. If no improvement then continue iv abx





* DM-BS uncontrolled 


- will hold off home dose Metformin 


- FS AC& HS 


- Insulin sliding scale 


- will check Hgb Alc 





* HTN


- will cont on home dose  Lisinopril


- will monitor off HCT in view of Na 135 and Bun 27 


- will monitor BP closely





* HDL


- will cont on Statin 





* VTE: Lovenox 


* F/E/N:  IV hydration, replace electrolytes as needed  and consistent carb 

diet.





 Covid test report pending 





Family Medical History


Family History: Denies





Visit type





- Medication Review


Med list reviewed for High Risk Meds patients 65 and older: Yes





- Emergency Visit


Emergency Visit: Yes


ED Registration Date: 07/25/20


Care time: The patient presented to the Emergency Department on the above date 

and was hospitalized for further evaluation of their emergent condition.





- New Patient


This patient is new to me today: Yes


Date on this admission: 07/25/20





- Critical Care


Critical Care patient: No

## 2020-07-25 NOTE — PDOC
History of Present Illness





- General


Chief Complaint: Abscess Boil


Stated Complaint: PENIS ABSCESS


Time Seen by Provider: 07/25/20 11:00





- History of Present Illness


Initial Comments: 





07/25/20 11:01


69 year old Malaysian male with PMH HTN, HLD, DM presenting with penile swelling 

and pain. Pt states swelling/pain has been present for about 4 days. Pt presents

with his son-in-law who is translating as the patient speaks Malaysian. Pt deneis

f/c. Pt takes lisinopril and metformin. Pt denies trauma. Pt denies abd pain. No

dysuria. No cp/sob. No f/c. No n/v/d. No penile discharge, but states tip of 

penis is red and swollen and tender. Pt denies scrotal pain. pt denies 

testicular pain. Pt denies all other complaints. Pt ambulated into the ER. 

States he was trying to see his pmd, couldn't get in, so he went to [facility] which recommended the patient come in for eval of the penile 

swelling. 








Past History





- Medical History


Allergies/Adverse Reactions: 


                                    Allergies











Allergy/AdvReac Type Severity Reaction Status Date / Time


 


No Known Allergies Allergy   Verified 07/25/20 11:26











Home Medications: 


Ambulatory Orders





Aspirin [Aspirin EC] 81 mg PO DAILY 02/03/20 


Atorvastatin Ca [Lipitor] 10 mg PO HS 02/03/20 


Enalapril Maleate [Vasotec] 20 mg PO DAILY 02/03/20 


Hydrochlorothiazide [Hctz -] 25 mg PO DAILY 02/03/20 


Metformin HCl [Glucophage] 1,000 mg PO BID 02/03/20 








COPD: No


Diabetes: Yes


HTN: Yes


Hypercholesterolemia: Yes





- Psycho-Social/Smoking History


Smoking History: Never smoked


Have you smoked in the past 12 months: No





**Review of Systems





- Review of Systems


Able to Perform ROS?: Yes


Comments:: 





07/25/20 11:35


son-in-law translating and present during the exam


Is the patient limited English proficient: Yes


Constitutional: No: Chills, Fever


HEENTM: No: Nose Congestion, Throat Swelling


Respiratory: No: Cough, Shortness of Breath


Cardiac (ROS): No: Chest Pain


ABD/GI: No: Diarrhea, Nausea, Vomiting, Abdominal cramping


: Yes: Other (penile swelling and pain)


Musculoskeletal: No: Back Pain, Joint Pain


Integumentary: Yes: Other (penile swelling and redness)


Neurological: No: Headache, Numbness, Paresthesia


All Other Systems: Reviewed and Negative





*Physical Exam





- Vital Signs





07/25/20 11:36


                                Selected Entries











  07/25/20





  11:00


 


Temperature 98.6 F


 


Pulse Rate 100 H


 


Respiratory 16





Rate 


 


Blood Pressure 129/90


 


Blood Pressure 103





Mean 


 


O2 Sat by Pulse 100





Oximetry (%) 


 


Weight 102.058 kg














- Physical Exam


General Appearance: Yes: Nourished, Appropriately Dressed.  No: Apparent 

Distress


HEENT: positive: EOMI, Normal Voice


Neck: positive: Supple


Respiratory/Chest: positive: Lungs Clear, Normal Breath Sounds.  negative: 

Respiratory Distress


Cardiovascular: positive: Regular Rate, S1, S2, Tachycardia


Gastrointestinal/Abdominal: positive: Soft.  negative: Guarding, Rebound, 

Tenderness


Male Genitalia: positive: other (pt is uncircumcised, penile redness and 

swelling, white discharge at tip of penis, ttp along the penis without 

fluctuance, induration, crepitus, soft tissue swelling from the foreskin to 

shaft of the penis).  negative: testicular tenderness, testicular mass, 

epididymus tender


Musculoskeletal: positive: Normal Inspection.  negative: CVA Tenderness


Extremity: negative: Swelling, Calf Tenderness


Integumentary: positive: Erythema (of the penile shaft)


Neurologic: positive: Fully Oriented, Alert, Other (ambulatory in the Er with a 

steady gait)





ED Treatment Course





- LABORATORY


CBC & Chemistry Diagram: 


                                 07/25/20 11:45





                                 07/25/20 11:45





Medical Decision Making





- Medical Decision Making





07/25/20 11:39


a/p: 70yo male with penile swelling


-sent from urgent care for further evaluation


-concern for balanitis with cellulitis spread proximally


-warmth, ttp, no crepitus or fluctuance


-pocus ordered and obtained that shows cobblestoning along the shaft of the 

penis without discrete abscess formation


-will send labs, cultures


-will start iv abx


-given hx of dm, will monitor and reassess


07/25/20 12:47


labs reviewed


elevated glu


no elevated wbc


call placed to Dr. Bach to discuss penile cellulitis


07/25/20 13:05


case discussed with Dr. Bach - given uncontrolled DM and tachy, admission 

for iv abx. If improvement can dc tomorrow on oral abx. If no improvement then 

continue iv abx


pt updated and agrees to the plan


iv abx given already


microblog sent to [facility] for admission


pt has been covid swabbed in the ER


07/25/20 13:18


case discussed with Connie from [facility] who accepts pt to service 





Discharge





- Discharge Information


Problems reviewed: Yes


Clinical Impression/Diagnosis: 


 Balanitis, Penile cellulitis





Condition: Fair





- Admission


Yes





- Follow up/Referral


Referrals: 


Kary Jo MD [Primary Care Provider] - 





- Patient Discharge Instructions





- Post Discharge Activity

## 2020-07-26 LAB
ANION GAP SERPL CALC-SCNC: 12 MMOL/L (ref 8–16)
BUN SERPL-MCNC: 26 MG/DL (ref 7–18)
CALCIUM SERPL-MCNC: 8.9 MG/DL (ref 8.5–10)
CHLORIDE SERPL-SCNC: 99 MMOL/L (ref 98–107)
CO2 SERPL-SCNC: 26 MMOL/L (ref 21–32)
CREAT SERPL-MCNC: 0.9 MG/DL (ref 0.55–1.3)
DEPRECATED RDW RBC AUTO: 11.8 % (ref 11.9–15.9)
GLUCOSE SERPL-MCNC: 226 MG/DL (ref 74–106)
HCT VFR BLD CALC: 38.1 % (ref 35.4–49)
HGB BLD-MCNC: 12.8 GM/DL (ref 11.7–16.9)
MCH RBC QN AUTO: 29.8 PG (ref 25.7–33.7)
MCHC RBC AUTO-ENTMCNC: 33.4 G/DL (ref 32–35.9)
MCV RBC: 89.2 FL (ref 80–96)
PLATELET # BLD AUTO: 204 K/MM3 (ref 134–434)
PMV BLD: 8.1 FL (ref 7.5–11.1)
POTASSIUM SERPLBLD-SCNC: 3.8 MMOL/L (ref 3.5–5.1)
RBC # BLD AUTO: 4.28 M/MM3 (ref 4–5.6)
SODIUM SERPL-SCNC: 137 MMOL/L (ref 136–145)
WBC # BLD AUTO: 7.2 K/MM3 (ref 4–10.8)

## 2020-07-26 RX ADMIN — INSULIN ASPART SCH UNIT: 100 INJECTION, SOLUTION INTRAVENOUS; SUBCUTANEOUS at 11:50

## 2020-07-26 RX ADMIN — INSULIN ASPART SCH UNIT: 100 INJECTION, SOLUTION INTRAVENOUS; SUBCUTANEOUS at 21:08

## 2020-07-26 RX ADMIN — INSULIN ASPART SCH UNIT: 100 INJECTION, SOLUTION INTRAVENOUS; SUBCUTANEOUS at 07:01

## 2020-07-26 RX ADMIN — ASPIRIN SCH MG: 81 TABLET, COATED ORAL at 09:52

## 2020-07-26 RX ADMIN — Medication SCH TAB: at 09:51

## 2020-07-26 RX ADMIN — CLINDAMYCIN PHOSPHATE SCH MLS/HR: 600 INJECTION, SOLUTION INTRAVENOUS at 09:52

## 2020-07-26 RX ADMIN — INSULIN ASPART SCH UNIT: 100 INJECTION, SOLUTION INTRAVENOUS; SUBCUTANEOUS at 16:18

## 2020-07-26 RX ADMIN — ENALAPRIL MALEATE SCH MG: 10 TABLET ORAL at 09:52

## 2020-07-26 RX ADMIN — SODIUM CHLORIDE SCH MLS/HR: 9 INJECTION, SOLUTION INTRAVENOUS at 13:37

## 2020-07-26 RX ADMIN — ATORVASTATIN CALCIUM SCH MG: 10 TABLET, FILM COATED ORAL at 21:08

## 2020-07-26 RX ADMIN — ENOXAPARIN SODIUM SCH MG: 40 INJECTION SUBCUTANEOUS at 09:52

## 2020-07-26 RX ADMIN — CLINDAMYCIN PHOSPHATE SCH MLS/HR: 600 INJECTION, SOLUTION INTRAVENOUS at 18:17

## 2020-07-26 RX ADMIN — CLINDAMYCIN PHOSPHATE SCH MLS/HR: 600 INJECTION, SOLUTION INTRAVENOUS at 02:04

## 2020-07-26 NOTE — EKG
Test Reason : 

Blood Pressure : ***/*** mmHG

Vent. Rate : 081 BPM     Atrial Rate : 081 BPM

   P-R Int : 218 ms          QRS Dur : 090 ms

    QT Int : 370 ms       P-R-T Axes : 056 -36 064 degrees

   QTc Int : 429 ms

 

SINUS RHYTHM WITH 1ST DEGREE A-V BLOCK

LEFT AXIS DEVIATION

INFERIOR INFARCT (CITED ON OR BEFORE 16-FEB-2018)

ABNORMAL ECG

WHEN COMPARED WITH ECG OF 03-FEB-2020 09:58,

NO SIGNIFICANT CHANGE WAS FOUND

Confirmed by MD Stephany, Jose E ([id number]) on 7/26/2020 5:16:52 PM

 

Referred By: DR SAW KILLIAN           Confirmed By:Jose E Hammond MD

## 2020-07-26 NOTE — PN
Progress Note, Physician


History of Present Illness: 





patient seen and examined at bedside. Daughter Serenity fragoso. no imaging done.

Afebrile. Blood glucose uncontrolled. Doesn't check it at home. Denies nausea 

vomiting fever chills chest pain or SOB. has penile pain. no dysuria or other 

urinary symptoms. Unable to retract penile foreskin.





- Current Medication List


Current Medications: 


Active Medications





Acetaminophen (Tylenol -)  650 mg PO Q6H PRN


   PRN Reason: FEVER


   Last Admin: 07/25/20 21:46 Dose:  650 mg


   Documented by: 


Aspirin (Ecotrin -)  81 mg PO DAILY [facility]


   Last Admin: 07/26/20 09:52 Dose:  81 mg


   Documented by: 


Atorvastatin Calcium (Lipitor -)  10 mg PO HS [facility]


   Last Admin: 07/25/20 21:46 Dose:  10 mg


   Documented by: 


Enalapril Maleate (Vasotec -)  20 mg PO DAILY [facility]


   Last Admin: 07/26/20 09:52 Dose:  20 mg


   Documented by: 


Enoxaparin Sodium (Lovenox -)  40 mg SQ DAILY [facility]


   Last Admin: 07/26/20 09:52 Dose:  40 mg


   Documented by: 


Sodium Chloride (Normal Saline -)  1,000 mls @ 75 mls/hr IV ASDIR [facility]


   Last Admin: 07/26/20 13:37 Dose:  75 mls/hr


   Documented by: 


Clindamycin Phosphate (Cleocin 600 Mg Premix Ivpb -)  600 mg in 50 mls @ 100 

mls/hr IVPB Q8H-IV [facility]; Protocol


   Last Admin: 07/26/20 09:52 Dose:  100 mls/hr


   Documented by: 


Insulin Aspart (Novolog Vial Sliding Scale -)  1 vial SQ [facility]; Protocol


   Last Admin: 07/26/20 16:18 Dose:  4 unit


   Documented by: 


Insulin Detemir (Levemir Vial)  8 units SQ [facility]


Lactobacillus Acidophilus (Bacid -)  1 tab PO DAILY [facility]


   Last Admin: 07/26/20 09:51 Dose:  1 tab


   Documented by: 


Tramadol HCl (Ultram -)  50 mg PO Q6H PRN


   PRN Reason: PAIN LEVEL 6-10











- Objective


Vital Signs: 


                                   Vital Signs











Temperature  99 F   07/26/20 14:07


 


Pulse Rate  89   07/26/20 14:07


 


Respiratory Rate  19   07/26/20 14:07


 


Blood Pressure  102/52 L  07/26/20 14:07


 


O2 Sat by Pulse Oximetry (%)  91 L  07/26/20 14:07











Constitutional: Yes: No Distress, Calm, Obese


Eyes: Yes: Conjunctiva Clear, EOM Intact


HENT: Yes: Atraumatic, Other (moist mucous membranes)


Neck: Yes: Supple


Cardiovascular: Yes: Regular Rate and Rhythm


Respiratory: Yes: CTA Bilaterally


Gastrointestinal: Yes: WNL, Normal Bowel Sounds, Soft, Abdomen, Obese


Genitourinary: Yes: Other (Uncircumsized penis. Phimosis. no crepitus. penile 

edema. erythema of the foreskin of penis. cant see glans of penis as foreskin 

can not be retracted.).  No: CVA Tenderness - Left, CVA Tenderness - Right


Edema: Yes


Edema: LLE: Trace, RLE: Trace


Neurological: Yes: Alert


Psychiatric: Yes: Alert


Labs: 


                                    CBC, BMP





                                 07/26/20 06:00 





                                 07/26/20 06:00 











- ....Imaging


Chest X-ray: Report Reviewed, Image Reviewed





Impression/Plan


Impression/Plan: 





69 year old Arabic male with PMH HTN, HLD, DM presents for the evaluation of 

penile swelling and pain. Admitted with phimosis and Penile cellulitis.





Balanitis, phimosis, Penile cellulitis, r/o fourniers gangrene


no leukocytosis 


UCx with 20,000 CFU of strep agalactiae group B


urology consult 


continue clindamycin


ID consult


Pain control


BCx negative so far


stop IVF





DM-BS uncontrolled 


needs better control given infection


HbA1C is 11.3%


hold metformin


will start levemir 8 units QHS and increase as needed


BGM and ISS TID AC and HS 


required 24 units of sliding scale in past 24 hours





HTN


continue on Lisinopril


Acceptable BP at this time





HLD


continue Statin 





DVT PPx: Lovenox 





F/E/N: 


Stop IVF


no electrolyte issues


diabetic/sodium controlled diet





 Covid test negative





Visit type





- Emergency Visit


Emergency Visit: Yes


ED Registration Date: 07/25/20


Care time: The patient presented to the Emergency Department on the above date 

and was hospitalized for further evaluation of their emergent condition.





- New Patient


This patient is new to me today: Yes


Date on this admission: 07/26/20





- Critical Care


Critical Care patient: No





- Medication Review


Med list reviewed for High Risk Meds patients 65 and older: Yes

## 2020-07-27 VITALS — TEMPERATURE: 98.6 F | SYSTOLIC BLOOD PRESSURE: 137 MMHG | HEART RATE: 80 BPM | DIASTOLIC BLOOD PRESSURE: 65 MMHG

## 2020-07-27 LAB
ANION GAP SERPL CALC-SCNC: 9 MMOL/L (ref 8–16)
BASOPHILS # BLD: 0.8 % (ref 0–2)
BUN SERPL-MCNC: 18 MG/DL (ref 7–18)
CALCIUM SERPL-MCNC: 8.4 MG/DL (ref 8.5–10)
CHLORIDE SERPL-SCNC: 103 MMOL/L (ref 98–107)
CO2 SERPL-SCNC: 26 MMOL/L (ref 21–32)
CREAT SERPL-MCNC: 1 MG/DL (ref 0.55–1.3)
DEPRECATED RDW RBC AUTO: 11.9 % (ref 11.9–15.9)
EOSINOPHIL # BLD: 2.3 % (ref 0–4.5)
GLUCOSE SERPL-MCNC: 188 MG/DL (ref 74–106)
HCT VFR BLD CALC: 36.8 % (ref 35.4–49)
HGB BLD-MCNC: 12.1 GM/DL (ref 11.7–16.9)
LYMPHOCYTES # BLD: 31.3 % (ref 8–40)
MAGNESIUM SERPL-MCNC: 1.9 MG/DL (ref 1.8–2.4)
MCH RBC QN AUTO: 29.4 PG (ref 25.7–33.7)
MCHC RBC AUTO-ENTMCNC: 32.9 G/DL (ref 32–35.9)
MCV RBC: 89.4 FL (ref 80–96)
MONOCYTES # BLD AUTO: 6.9 % (ref 3.8–10.2)
NEUTROPHILS # BLD: 58.7 % (ref 42.8–82.8)
PLATELET # BLD AUTO: 260 K/MM3 (ref 134–434)
PMV BLD: 7 FL (ref 7.5–11.1)
POTASSIUM SERPLBLD-SCNC: 4 MMOL/L (ref 3.5–5.1)
RBC # BLD AUTO: 4.12 M/MM3 (ref 4–5.6)
SODIUM SERPL-SCNC: 138 MMOL/L (ref 136–145)
WBC # BLD AUTO: 7.1 K/MM3 (ref 4–10.8)

## 2020-07-27 RX ADMIN — CLINDAMYCIN PHOSPHATE SCH MLS/HR: 600 INJECTION, SOLUTION INTRAVENOUS at 01:25

## 2020-07-27 RX ADMIN — ENALAPRIL MALEATE SCH MG: 10 TABLET ORAL at 10:31

## 2020-07-27 RX ADMIN — Medication SCH TAB: at 10:13

## 2020-07-27 RX ADMIN — INSULIN ASPART SCH UNIT: 100 INJECTION, SOLUTION INTRAVENOUS; SUBCUTANEOUS at 06:59

## 2020-07-27 RX ADMIN — ENOXAPARIN SODIUM SCH MG: 40 INJECTION SUBCUTANEOUS at 10:14

## 2020-07-27 RX ADMIN — ASPIRIN SCH MG: 81 TABLET, COATED ORAL at 10:13

## 2020-07-27 RX ADMIN — INSULIN ASPART SCH UNIT: 100 INJECTION, SOLUTION INTRAVENOUS; SUBCUTANEOUS at 12:16

## 2020-07-27 NOTE — PN
Progress Note (short form)





- Note


Progress Note: 





ID CONSULT DICTATED


BALANOPOSTHITIS/PENILE CELLULITIS


DIABETES MELLITUS


+URINE C/S GRP B STREP


CEFTRIAXONE/ FLUCONAZOLE 


TOPICAL ANTIFUNGAL


 EVALUATION


IF CLEARED BY  SUBSTITUTE 


  PO CEFTIN 500MG  BID X7D,


  PO FLUCONAZOLE 100MG  QD X 7D


  TOPICAL ANTIFUNGAL

## 2020-07-27 NOTE — DS
Physical Exam: 


SUBJECTIVE: Patient seen and examined








OBJECTIVE:





                                   Vital Signs











 Period  Temp  Pulse  Resp  BP Sys/Haynes  Pulse Ox


 


 Last 24 Hr  98.1 F-99.2 F  69-89  18-19  102-144/52-88  91-99








PHYSICAL EXAM





GENERAL: The patient is awake, alert, and fully oriented, in no acute distress.


LUNGS: Breath sounds equal, clear to auscultation bilaterally, no wheezes, no 

crackles, no accessory muscle use. 


HEART: Regular rate and rhythm, S1, S2 without murmur, rub or gallop.


ABDOMEN: Soft, nontender, nondistended, normoactive bowel sounds


: Uncircumcised penis, able to only minimally retract foreskin, no drainage; 

mild erythema, no tenderness


EXTREMITIES: 2+ pulses, warm, well-perfused, no edema. 


NEUROLOGICAL: Cranial nerves II through XII grossly intact. Normal speech





LABS


                         Laboratory Results - last 24 hr











  07/26/20 07/26/20 07/26/20





  11:45 16:12 20:55


 


WBC   


 


RBC   


 


Hgb   


 


Hct   


 


MCV   


 


MCH   


 


MCHC   


 


RDW   


 


Plt Count   


 


MPV   


 


Absolute Neuts (auto)   


 


Neutrophils %   


 


Lymphocytes %   


 


Monocytes %   


 


Eosinophils %   


 


Basophils %   


 


Sodium   


 


Potassium   


 


Chloride   


 


Carbon Dioxide   


 


Anion Gap   


 


BUN   


 


Creatinine   


 


Est GFR (CKD-EPI)AfAm   


 


Est GFR (CKD-EPI)NonAf   


 


POC Glucometer  269  224  264


 


Random Glucose   


 


Calcium   


 


Magnesium   














  07/27/20 07/27/20 07/27/20





  06:10 07:02 07:02


 


WBC   7.1 


 


RBC   4.12 


 


Hgb   12.1 


 


Hct   36.8 


 


MCV   89.4 


 


MCH   29.4 


 


MCHC   32.9 


 


RDW   11.9 


 


Plt Count   260  D 


 


MPV   7.0 L D 


 


Absolute Neuts (auto)   4.1 


 


Neutrophils %   58.7 


 


Lymphocytes %   31.3 


 


Monocytes %   6.9 


 


Eosinophils %   2.3 


 


Basophils %   0.8 


 


Sodium    138


 


Potassium    4.0


 


Chloride    103


 


Carbon Dioxide    26


 


Anion Gap    9


 


BUN    18.0


 


Creatinine    1.0


 


Est GFR (CKD-EPI)AfAm    89.23


 


Est GFR (CKD-EPI)NonAf    76.99


 


POC Glucometer  158  


 


Random Glucose    188 H


 


Calcium    8.4 L


 


Magnesium    1.9

















Date of Admission:07/25/20





Date of Discharge: 07/27/20





Pre hospital course


69 year-old male with PMH significant for  HTN, HLD, and Type II NIDDM, 

presented to [facility] with penile swelling and pain x 4 days. Denied fever, sweats, 

chills. Denied trauma. No penile discharge, but states tip of penis is red and 

swollen and tender. Pt denies scrotal pain. pt denies testicular pain. Pt denies

all other complaints. Pt ambulated into the ER. States he was trying to see his 

pmd, couldn't get in, so he went to [facility] which recommended the 

patient come in for evaluation. 





Hospital course


Balanoposthitis/penile cellulitis


   --empirically treated with ceftriaxone and fluconazole, topical Nystatin 

powder


   --discharged on Ceftin and fluconazole for an additional 7 days











Minutes to complete discharge: 35





Discharge Summary


Problems reviewed: Yes


Reason For Visit: PENIS ABSCESS


Current Active Problems





Balanitis (Acute)


Penile cellulitis (Acute)








Condition: Improved





- Instructions


Diet, Activity, Other Instructions: 


Three prescriptions have been sent to your pharmacy: one is for ceftin (pills) 

and the other is for fluconazole (pills). Take these medications as prescribed 

and be sure to finish all the medication.





The third prescription is for Nystatin powder which you should apply to your 

penis and scrotum once a day.





It is important you follow up with a urologist within 1-2 weeks of your 

discharge. If you wish to see Dr. Bach, his contact information is 

enclosed. Please call his office to make an appointment.


Referrals: 


Hira Bach MD [Staff Physician] - 1 Week


Kary Jo MD [Primary Care Provider] - 


Disposition: HOME





- Home Medications


Comprehensive Discharge Medication List: 


Ambulatory Orders





Aspirin [Aspirin EC] 81 mg PO DAILY 02/03/20 


Atorvastatin Ca [Lipitor] 10 mg PO HS 02/03/20 


Enalapril Maleate [Vasotec] 20 mg PO DAILY 02/03/20 


Hydrochlorothiazide [Hctz -] 25 mg PO DAILY 02/03/20 


Metformin HCl [Glucophage] 1,000 mg PO BID 02/03/20 








This patient is new to me today: Yes


Date on this admission: 08/18/20


Emergency Visit: Yes


ED Registration Date: 07/25/20


Care time: The patient presented to the Emergency Department on the above date 

and was hospitalized for further evaluation of their emergent condition.


Critical Care patient: No





- Discharge Referral


Referred to [facility] No

## 2020-07-27 NOTE — CONS
DATE OF CONSULTATION:  

 

DATE OF DICTATION:  07/27/2020

 

INFECTIOUS DISEASE CONSULTATION 

 

HISTORY OF PRESENT ILLNESS:  The patient is a 69-year-old Yi-speaking male who

is evaluated for balanitis.  

 

He was admitted to the hospital on July 27, 2020, with a 4-day history of increasing

erythema, pain and swelling of the distal aspect of his penile shaft.

 

History was obtained from the chart.  There was no report of any genital trauma.  He

denied urinary tract complaints.  No dysuria or hematuria.  No difficulty voiding. 

He has had no urethral discharge.  He had no associated fever or chills.  The patient

lives with his significant other and is sexually active.  He was seen at a nursing

care center and was referred to the emergency room.  In the hospital, he was noted to

have balanitis and erythema involving the distal aspect of the penile shaft.  He was

treated empirically with clindamycin.  Patient has history of diabetes mellitus on

oral medication.  

 

PAST MEDICAL HISTORY:  Positive for diabetes mellitus, hypertension, hyperlipidemia. 

 

 

ALLERGIES:  No known allergies.

 

MEDICATION:  At the present time include:

1.  Clindamycin. 

2.  Lovenox. 

3.  Lipitor. 

4.  Enalapril. 

5.  Tylenol. 

6.  Aspirin. 

7.  Tramadol. 

 

SOCIAL HISTORY:  He is Yi speaking.  He lives in the community at home with his

wife.  Nonsmoker.  Occasional ETOH.

 

SYSTEMS REVIEW:

Neurologic:  No loss of consciousness, seizure activity, focal weakness.  

Cardiac:  Negative for chest pain or palpitations.  

Respiratory:  Negative for cough or sputum production.  

Gastrointestinal:  Negative vomiting or diarrhea.  

Genitourinary:  As per HPI.  

  

LABORATORY DATA:  White count 7.1, hematocrit 36.8, platelets 260, creatinine 1.0. 

Chest x-ray negative.  Ultrasound negative for soft tissue abscess.  COVID-19 smear

negative.  Urinalysis 0-2 white cells.  Blood cultures preliminarily negative.  Urine

culture group B strep.  

 

PHYSICAL EXAMINATION:

General:  On physical examination, he is supine in bed in no acute distress.  

Vital signs:  Temperature 98.1, blood pressure 135/77, pulse 72 regular, respirations

18 per minute.  

HEENT:  Sclerae anicteric.  

Cardiovascular:  Heart sounds S1, S2.

Lungs:  Clear.  

Abdomen:  Soft.  

Genitourinary:  Examination of the genital area  __________ uncircumcised.  There is

swelling present of the foreskin.  I am unable to fully retract the foreskin.  The

foreskin is erythematous, swollen, and warm.  There is also erythema, warmth, and

swelling extending to the mid shaft of the penis.  No purulent drainage is noted.  No

palpable inguinal adenopathy.  

 

IMPRESSION:  

1.  Balanoposthitis/penile cellulitis.  

2.  Diabetes mellitus. 

3.  Positive urine culture, grew B. strep. 

 

Empiric antibiotic coverage with ceftriaxone and fluconazole.  Topical antifungal

therapy.  Urology evaluation.  At the present time, patient has no problems voiding. 

If cleared by urology, may substitute oral antibiotic therapy with Ceftin 500 mg p.o.

b.i.d. for 7 days and fluconazole 100 mg p.o. daily for 7 days with topical

antifungal and outpatient urology followup.  

 

Thank you for the kind referral.  

 

 

HERMAN BERGER M.D.

 

[id number]

DD: 07/27/2020 11:09

DT: 07/27/2020 16:42

Job #:  [id number]